# Patient Record
Sex: MALE | Race: WHITE | Employment: FULL TIME | URBAN - METROPOLITAN AREA
[De-identification: names, ages, dates, MRNs, and addresses within clinical notes are randomized per-mention and may not be internally consistent; named-entity substitution may affect disease eponyms.]

---

## 2024-07-30 ENCOUNTER — HOSPITAL ENCOUNTER (INPATIENT)
Age: 59
LOS: 3 days | Discharge: HOME | End: 2024-08-02
Attending: EMERGENCY MEDICINE | Admitting: INTERNAL MEDICINE
Payer: COMMERCIAL

## 2024-07-30 ENCOUNTER — APPOINTMENT (OUTPATIENT)
Dept: CT IMAGING | Age: 59
End: 2024-07-30
Payer: COMMERCIAL

## 2024-07-30 DIAGNOSIS — K56.609 SBO (SMALL BOWEL OBSTRUCTION) (HCC): Primary | ICD-10-CM

## 2024-07-30 LAB
ALBUMIN SERPL-MCNC: 4.3 G/DL (ref 3.5–5.2)
ALP SERPL-CCNC: 75 U/L (ref 40–129)
ALT SERPL-CCNC: 20 U/L (ref 5–41)
ANION GAP SERPL CALCULATED.3IONS-SCNC: 12 MMOL/L (ref 9–17)
AST SERPL-CCNC: 12 U/L
BASOPHILS # BLD: 0.1 K/UL (ref 0–0.2)
BASOPHILS NFR BLD: 1 % (ref 0–2)
BILIRUB SERPL-MCNC: 0.5 MG/DL (ref 0.3–1.2)
BILIRUB UR QL STRIP: NEGATIVE
BUN SERPL-MCNC: 11 MG/DL (ref 6–20)
CALCIUM SERPL-MCNC: 9.7 MG/DL (ref 8.6–10.4)
CHLORIDE SERPL-SCNC: 99 MMOL/L (ref 98–107)
CLARITY UR: CLEAR
CO2 SERPL-SCNC: 25 MMOL/L (ref 20–31)
COLOR UR: YELLOW
COMMENT: NORMAL
CREAT SERPL-MCNC: 0.9 MG/DL (ref 0.7–1.2)
EOSINOPHIL # BLD: 0.1 K/UL (ref 0–0.4)
EOSINOPHILS RELATIVE PERCENT: 1 % (ref 0–4)
ERYTHROCYTE [DISTWIDTH] IN BLOOD BY AUTOMATED COUNT: 13 % (ref 11.5–14.9)
GFR, ESTIMATED: >90 ML/MIN/1.73M2
GLUCOSE SERPL-MCNC: 156 MG/DL (ref 70–99)
GLUCOSE UR STRIP-MCNC: NEGATIVE MG/DL
HCT VFR BLD AUTO: 39.5 % (ref 41–53)
HGB BLD-MCNC: 13.1 G/DL (ref 13.5–17.5)
HGB UR QL STRIP.AUTO: NEGATIVE
KETONES UR STRIP-MCNC: NEGATIVE MG/DL
LACTATE BLDV-SCNC: 1 MMOL/L (ref 0.5–2.2)
LEUKOCYTE ESTERASE UR QL STRIP: NEGATIVE
LIPASE SERPL-CCNC: 19 U/L (ref 13–60)
LYMPHOCYTES NFR BLD: 1.4 K/UL (ref 1–4.8)
LYMPHOCYTES RELATIVE PERCENT: 18 % (ref 24–44)
MCH RBC QN AUTO: 31.4 PG (ref 26–34)
MCHC RBC AUTO-ENTMCNC: 33.2 G/DL (ref 31–37)
MCV RBC AUTO: 94.4 FL (ref 80–100)
MONOCYTES NFR BLD: 0.9 K/UL (ref 0.1–1.3)
MONOCYTES NFR BLD: 12 % (ref 1–7)
NEUTROPHILS NFR BLD: 68 % (ref 36–66)
NEUTS SEG NFR BLD: 5.5 K/UL (ref 1.3–9.1)
NITRITE UR QL STRIP: NEGATIVE
PH UR STRIP: 7 [PH] (ref 5–8)
PLATELET # BLD AUTO: 287 K/UL (ref 150–450)
PMV BLD AUTO: 7.1 FL (ref 6–12)
POTASSIUM SERPL-SCNC: 4.1 MMOL/L (ref 3.7–5.3)
PROT SERPL-MCNC: 7.3 G/DL (ref 6.4–8.3)
PROT UR STRIP-MCNC: NEGATIVE MG/DL
RBC # BLD AUTO: 4.19 M/UL (ref 4.5–5.9)
SODIUM SERPL-SCNC: 136 MMOL/L (ref 135–144)
SP GR UR STRIP: 1.01 (ref 1–1.03)
T4 FREE SERPL-MCNC: 1.2 NG/DL (ref 0.9–1.7)
TSH SERPL DL<=0.05 MIU/L-ACNC: 1.25 UIU/ML (ref 0.3–5)
UROBILINOGEN UR STRIP-ACNC: NORMAL EU/DL (ref 0–1)
WBC OTHER # BLD: 7.9 K/UL (ref 3.5–11)

## 2024-07-30 PROCEDURE — 0D9670Z DRAINAGE OF STOMACH WITH DRAINAGE DEVICE, VIA NATURAL OR ARTIFICIAL OPENING: ICD-10-PCS | Performed by: INTERNAL MEDICINE

## 2024-07-30 PROCEDURE — 74177 CT ABD & PELVIS W/CONTRAST: CPT

## 2024-07-30 PROCEDURE — 2580000003 HC RX 258: Performed by: EMERGENCY MEDICINE

## 2024-07-30 PROCEDURE — 84439 ASSAY OF FREE THYROXINE: CPT

## 2024-07-30 PROCEDURE — 96375 TX/PRO/DX INJ NEW DRUG ADDON: CPT

## 2024-07-30 PROCEDURE — 1200000000 HC SEMI PRIVATE

## 2024-07-30 PROCEDURE — 80053 COMPREHEN METABOLIC PANEL: CPT

## 2024-07-30 PROCEDURE — 83605 ASSAY OF LACTIC ACID: CPT

## 2024-07-30 PROCEDURE — 83690 ASSAY OF LIPASE: CPT

## 2024-07-30 PROCEDURE — 99285 EMERGENCY DEPT VISIT HI MDM: CPT

## 2024-07-30 PROCEDURE — 6360000004 HC RX CONTRAST MEDICATION: Performed by: EMERGENCY MEDICINE

## 2024-07-30 PROCEDURE — 36415 COLL VENOUS BLD VENIPUNCTURE: CPT

## 2024-07-30 PROCEDURE — 81003 URINALYSIS AUTO W/O SCOPE: CPT

## 2024-07-30 PROCEDURE — 96374 THER/PROPH/DIAG INJ IV PUSH: CPT

## 2024-07-30 PROCEDURE — 85025 COMPLETE CBC W/AUTO DIFF WBC: CPT

## 2024-07-30 PROCEDURE — 6360000002 HC RX W HCPCS: Performed by: EMERGENCY MEDICINE

## 2024-07-30 PROCEDURE — 84443 ASSAY THYROID STIM HORMONE: CPT

## 2024-07-30 RX ORDER — LOSARTAN POTASSIUM 25 MG/1
25 TABLET ORAL NIGHTLY
COMMUNITY

## 2024-07-30 RX ORDER — ONDANSETRON 2 MG/ML
4 INJECTION INTRAMUSCULAR; INTRAVENOUS ONCE
Status: COMPLETED | OUTPATIENT
Start: 2024-07-30 | End: 2024-07-30

## 2024-07-30 RX ORDER — SODIUM CHLORIDE 0.9 % (FLUSH) 0.9 %
10 SYRINGE (ML) INJECTION PRN
Status: COMPLETED | OUTPATIENT
Start: 2024-07-30 | End: 2024-07-30

## 2024-07-30 RX ORDER — 0.9 % SODIUM CHLORIDE 0.9 %
100 INTRAVENOUS SOLUTION INTRAVENOUS ONCE
Status: COMPLETED | OUTPATIENT
Start: 2024-07-30 | End: 2024-07-30

## 2024-07-30 RX ORDER — DROPERIDOL 2.5 MG/ML
0.62 INJECTION, SOLUTION INTRAMUSCULAR; INTRAVENOUS EVERY 6 HOURS PRN
Status: DISCONTINUED | OUTPATIENT
Start: 2024-07-30 | End: 2024-08-02 | Stop reason: HOSPADM

## 2024-07-30 RX ORDER — FAMOTIDINE 10 MG
20 TABLET ORAL DAILY
COMMUNITY

## 2024-07-30 RX ORDER — 0.9 % SODIUM CHLORIDE 0.9 %
1000 INTRAVENOUS SOLUTION INTRAVENOUS ONCE
Status: COMPLETED | OUTPATIENT
Start: 2024-07-30 | End: 2024-07-30

## 2024-07-30 RX ORDER — DROPERIDOL 2.5 MG/ML
0.62 INJECTION, SOLUTION INTRAMUSCULAR; INTRAVENOUS ONCE
Status: COMPLETED | OUTPATIENT
Start: 2024-07-30 | End: 2024-07-30

## 2024-07-30 RX ADMIN — SODIUM CHLORIDE 100 ML: 9 INJECTION, SOLUTION INTRAVENOUS at 22:48

## 2024-07-30 RX ADMIN — IOPAMIDOL 75 ML: 755 INJECTION, SOLUTION INTRAVENOUS at 22:49

## 2024-07-30 RX ADMIN — DROPERIDOL 0.62 MG: 2.5 INJECTION, SOLUTION INTRAMUSCULAR; INTRAVENOUS at 22:35

## 2024-07-30 RX ADMIN — ONDANSETRON 4 MG: 2 INJECTION INTRAMUSCULAR; INTRAVENOUS at 21:47

## 2024-07-30 RX ADMIN — SODIUM CHLORIDE 1000 ML: 9 INJECTION, SOLUTION INTRAVENOUS at 21:46

## 2024-07-30 RX ADMIN — SODIUM CHLORIDE, PRESERVATIVE FREE 10 ML: 5 INJECTION INTRAVENOUS at 22:49

## 2024-07-30 ASSESSMENT — ENCOUNTER SYMPTOMS
EYE DISCHARGE: 0
NAUSEA: 1
ABDOMINAL DISTENTION: 1
FACIAL SWELLING: 0
BACK PAIN: 0
TROUBLE SWALLOWING: 0
EYE PAIN: 0
DIARRHEA: 1
COLOR CHANGE: 0
EYE REDNESS: 0
CHEST TIGHTNESS: 0
BLOOD IN STOOL: 0
VOMITING: 1
RHINORRHEA: 0
SORE THROAT: 0
SINUS PRESSURE: 0
ABDOMINAL PAIN: 1
SHORTNESS OF BREATH: 0
WHEEZING: 0
CONSTIPATION: 0
COUGH: 0

## 2024-07-30 ASSESSMENT — PAIN DESCRIPTION - DESCRIPTORS: DESCRIPTORS: CRAMPING

## 2024-07-30 ASSESSMENT — PAIN DESCRIPTION - ORIENTATION: ORIENTATION: LOWER

## 2024-07-30 ASSESSMENT — PAIN DESCRIPTION - PAIN TYPE: TYPE: ACUTE PAIN

## 2024-07-30 ASSESSMENT — PAIN SCALES - GENERAL: PAINLEVEL_OUTOF10: 5

## 2024-07-30 ASSESSMENT — PAIN DESCRIPTION - LOCATION: LOCATION: ABDOMEN

## 2024-07-30 ASSESSMENT — PAIN - FUNCTIONAL ASSESSMENT: PAIN_FUNCTIONAL_ASSESSMENT: 0-10

## 2024-07-31 ENCOUNTER — APPOINTMENT (OUTPATIENT)
Dept: GENERAL RADIOLOGY | Age: 59
End: 2024-07-31
Payer: COMMERCIAL

## 2024-07-31 ENCOUNTER — APPOINTMENT (OUTPATIENT)
Dept: GENERAL RADIOLOGY | Age: 59
End: 2024-07-31
Attending: SURGERY
Payer: COMMERCIAL

## 2024-07-31 LAB
ANION GAP SERPL CALCULATED.3IONS-SCNC: 11 MMOL/L (ref 9–17)
BASOPHILS # BLD: 0 K/UL (ref 0–0.2)
BASOPHILS NFR BLD: 1 % (ref 0–2)
BUN SERPL-MCNC: 9 MG/DL (ref 6–20)
CALCIUM SERPL-MCNC: 8.8 MG/DL (ref 8.6–10.4)
CHLORIDE SERPL-SCNC: 102 MMOL/L (ref 98–107)
CO2 SERPL-SCNC: 23 MMOL/L (ref 20–31)
CREAT SERPL-MCNC: 0.9 MG/DL (ref 0.7–1.2)
EOSINOPHIL # BLD: 0.1 K/UL (ref 0–0.4)
EOSINOPHILS RELATIVE PERCENT: 1 % (ref 0–4)
ERYTHROCYTE [DISTWIDTH] IN BLOOD BY AUTOMATED COUNT: 13.1 % (ref 11.5–14.9)
GFR, ESTIMATED: >90 ML/MIN/1.73M2
GLUCOSE SERPL-MCNC: 123 MG/DL (ref 70–99)
HCT VFR BLD AUTO: 37.6 % (ref 41–53)
HGB BLD-MCNC: 12.5 G/DL (ref 13.5–17.5)
LACTATE BLDV-SCNC: 0.9 MMOL/L (ref 0.5–2.2)
LYMPHOCYTES NFR BLD: 1.9 K/UL (ref 1–4.8)
LYMPHOCYTES RELATIVE PERCENT: 24 % (ref 24–44)
MCH RBC QN AUTO: 32.2 PG (ref 26–34)
MCHC RBC AUTO-ENTMCNC: 33.4 G/DL (ref 31–37)
MCV RBC AUTO: 96.7 FL (ref 80–100)
MONOCYTES NFR BLD: 1.1 K/UL (ref 0.1–1.3)
MONOCYTES NFR BLD: 13 % (ref 1–7)
NEUTROPHILS NFR BLD: 61 % (ref 36–66)
NEUTS SEG NFR BLD: 4.9 K/UL (ref 1.3–9.1)
PLATELET # BLD AUTO: 271 K/UL (ref 150–450)
PMV BLD AUTO: 6.9 FL (ref 6–12)
POTASSIUM SERPL-SCNC: 4 MMOL/L (ref 3.7–5.3)
RBC # BLD AUTO: 3.89 M/UL (ref 4.5–5.9)
SODIUM SERPL-SCNC: 136 MMOL/L (ref 135–144)
WBC OTHER # BLD: 8 K/UL (ref 3.5–11)

## 2024-07-31 PROCEDURE — 83605 ASSAY OF LACTIC ACID: CPT

## 2024-07-31 PROCEDURE — 83036 HEMOGLOBIN GLYCOSYLATED A1C: CPT

## 2024-07-31 PROCEDURE — 74250 X-RAY XM SM INT 1CNTRST STD: CPT

## 2024-07-31 PROCEDURE — 6360000004 HC RX CONTRAST MEDICATION: Performed by: SURGERY

## 2024-07-31 PROCEDURE — 74018 RADEX ABDOMEN 1 VIEW: CPT

## 2024-07-31 PROCEDURE — 6360000002 HC RX W HCPCS: Performed by: SURGERY

## 2024-07-31 PROCEDURE — 2580000003 HC RX 258: Performed by: EMERGENCY MEDICINE

## 2024-07-31 PROCEDURE — 1200000000 HC SEMI PRIVATE

## 2024-07-31 PROCEDURE — 36415 COLL VENOUS BLD VENIPUNCTURE: CPT

## 2024-07-31 PROCEDURE — 2500000003 HC RX 250 WO HCPCS: Performed by: NURSE PRACTITIONER

## 2024-07-31 PROCEDURE — 6360000002 HC RX W HCPCS: Performed by: NURSE PRACTITIONER

## 2024-07-31 PROCEDURE — 2580000003 HC RX 258: Performed by: SURGERY

## 2024-07-31 PROCEDURE — 80048 BASIC METABOLIC PNL TOTAL CA: CPT

## 2024-07-31 PROCEDURE — 85025 COMPLETE CBC W/AUTO DIFF WBC: CPT

## 2024-07-31 PROCEDURE — 99223 1ST HOSP IP/OBS HIGH 75: CPT | Performed by: SURGERY

## 2024-07-31 PROCEDURE — 99223 1ST HOSP IP/OBS HIGH 75: CPT | Performed by: INTERNAL MEDICINE

## 2024-07-31 PROCEDURE — 2580000003 HC RX 258: Performed by: NURSE PRACTITIONER

## 2024-07-31 PROCEDURE — 6360000002 HC RX W HCPCS: Performed by: EMERGENCY MEDICINE

## 2024-07-31 PROCEDURE — 71045 X-RAY EXAM CHEST 1 VIEW: CPT

## 2024-07-31 RX ORDER — ENOXAPARIN SODIUM 100 MG/ML
40 INJECTION SUBCUTANEOUS DAILY
Status: DISCONTINUED | OUTPATIENT
Start: 2024-07-31 | End: 2024-08-02 | Stop reason: HOSPADM

## 2024-07-31 RX ORDER — SODIUM CHLORIDE 0.9 % (FLUSH) 0.9 %
10 SYRINGE (ML) INJECTION PRN
Status: DISCONTINUED | OUTPATIENT
Start: 2024-07-31 | End: 2024-08-02 | Stop reason: HOSPADM

## 2024-07-31 RX ORDER — ONDANSETRON 4 MG/1
4 TABLET, ORALLY DISINTEGRATING ORAL EVERY 8 HOURS PRN
Status: DISCONTINUED | OUTPATIENT
Start: 2024-07-31 | End: 2024-08-02 | Stop reason: HOSPADM

## 2024-07-31 RX ORDER — POTASSIUM CHLORIDE 20 MEQ/1
40 TABLET, EXTENDED RELEASE ORAL PRN
Status: DISCONTINUED | OUTPATIENT
Start: 2024-07-31 | End: 2024-08-01

## 2024-07-31 RX ORDER — SODIUM CHLORIDE 9 MG/ML
INJECTION, SOLUTION INTRAVENOUS PRN
Status: DISCONTINUED | OUTPATIENT
Start: 2024-07-31 | End: 2024-08-02 | Stop reason: HOSPADM

## 2024-07-31 RX ORDER — ENALAPRILAT 1.25 MG/ML
1.25 INJECTION INTRAVENOUS EVERY 6 HOURS PRN
Status: DISCONTINUED | OUTPATIENT
Start: 2024-07-31 | End: 2024-08-02 | Stop reason: HOSPADM

## 2024-07-31 RX ORDER — ACETAMINOPHEN 325 MG/1
650 TABLET ORAL EVERY 6 HOURS PRN
Status: DISCONTINUED | OUTPATIENT
Start: 2024-07-31 | End: 2024-08-02 | Stop reason: HOSPADM

## 2024-07-31 RX ORDER — ENOXAPARIN SODIUM 100 MG/ML
30 INJECTION SUBCUTANEOUS DAILY
Status: DISCONTINUED | OUTPATIENT
Start: 2024-07-31 | End: 2024-07-31

## 2024-07-31 RX ORDER — ONDANSETRON 2 MG/ML
4 INJECTION INTRAMUSCULAR; INTRAVENOUS EVERY 6 HOURS PRN
Status: DISCONTINUED | OUTPATIENT
Start: 2024-07-31 | End: 2024-08-02 | Stop reason: HOSPADM

## 2024-07-31 RX ORDER — SODIUM CHLORIDE 0.9 % (FLUSH) 0.9 %
5-40 SYRINGE (ML) INJECTION EVERY 12 HOURS SCHEDULED
Status: DISCONTINUED | OUTPATIENT
Start: 2024-07-31 | End: 2024-08-02 | Stop reason: HOSPADM

## 2024-07-31 RX ORDER — HYDRALAZINE HYDROCHLORIDE 20 MG/ML
10 INJECTION INTRAMUSCULAR; INTRAVENOUS EVERY 6 HOURS PRN
Status: DISCONTINUED | OUTPATIENT
Start: 2024-07-31 | End: 2024-08-02 | Stop reason: HOSPADM

## 2024-07-31 RX ORDER — POTASSIUM CHLORIDE 7.45 MG/ML
10 INJECTION INTRAVENOUS PRN
Status: DISCONTINUED | OUTPATIENT
Start: 2024-07-31 | End: 2024-08-01

## 2024-07-31 RX ORDER — ACETAMINOPHEN 650 MG/1
650 SUPPOSITORY RECTAL EVERY 6 HOURS PRN
Status: DISCONTINUED | OUTPATIENT
Start: 2024-07-31 | End: 2024-08-02 | Stop reason: HOSPADM

## 2024-07-31 RX ORDER — MAGNESIUM SULFATE HEPTAHYDRATE 40 MG/ML
2000 INJECTION, SOLUTION INTRAVENOUS PRN
Status: DISCONTINUED | OUTPATIENT
Start: 2024-07-31 | End: 2024-08-02 | Stop reason: HOSPADM

## 2024-07-31 RX ORDER — SODIUM CHLORIDE 9 MG/ML
INJECTION, SOLUTION INTRAVENOUS CONTINUOUS
Status: ACTIVE | OUTPATIENT
Start: 2024-07-31 | End: 2024-08-02

## 2024-07-31 RX ORDER — MORPHINE SULFATE 2 MG/ML
2 INJECTION, SOLUTION INTRAMUSCULAR; INTRAVENOUS
Status: DISCONTINUED | OUTPATIENT
Start: 2024-07-31 | End: 2024-08-02 | Stop reason: HOSPADM

## 2024-07-31 RX ADMIN — MORPHINE SULFATE 2 MG: 2 INJECTION, SOLUTION INTRAMUSCULAR; INTRAVENOUS at 00:11

## 2024-07-31 RX ADMIN — SODIUM CHLORIDE: 9 INJECTION, SOLUTION INTRAVENOUS at 18:07

## 2024-07-31 RX ADMIN — DIATRIZOATE MEGLUMINE AND DIATRIZOATE SODIUM 360 ML: 660; 100 LIQUID ORAL; RECTAL at 08:39

## 2024-07-31 RX ADMIN — SODIUM CHLORIDE: 9 INJECTION, SOLUTION INTRAVENOUS at 00:33

## 2024-07-31 RX ADMIN — DROPERIDOL 0.62 MG: 2.5 INJECTION, SOLUTION INTRAMUSCULAR; INTRAVENOUS at 12:36

## 2024-07-31 RX ADMIN — ONDANSETRON 4 MG: 2 INJECTION INTRAMUSCULAR; INTRAVENOUS at 09:14

## 2024-07-31 RX ADMIN — ENOXAPARIN SODIUM 40 MG: 100 INJECTION SUBCUTANEOUS at 09:02

## 2024-07-31 RX ADMIN — ENALAPRILAT 1.25 MG: 1.25 INJECTION INTRAVENOUS at 09:02

## 2024-07-31 RX ADMIN — SODIUM CHLORIDE, PRESERVATIVE FREE 40 MG: 5 INJECTION INTRAVENOUS at 09:02

## 2024-07-31 RX ADMIN — PANTOPRAZOLE SODIUM 40 MG: 40 INJECTION, POWDER, FOR SOLUTION INTRAVENOUS at 00:11

## 2024-07-31 ASSESSMENT — ENCOUNTER SYMPTOMS
RHINORRHEA: 0
SHORTNESS OF BREATH: 0
SORE THROAT: 0
COUGH: 0

## 2024-07-31 ASSESSMENT — PAIN SCALES - GENERAL
PAINLEVEL_OUTOF10: 8
PAINLEVEL_OUTOF10: 0
PAINLEVEL_OUTOF10: 0

## 2024-07-31 NOTE — PROGRESS NOTES
Pt arrive to the floor and place in room 2050. Pt alert x 4. Admission question and assessment completed. Pt NG tube hook up to low- intermittent suction. Bedside table and call light in reach. No s/s of distress noted. Plan of care on going.     Pt diet order updated to NPO w/ ice chips. Order receive from Sue OCHOA

## 2024-07-31 NOTE — PLAN OF CARE
Problem: Discharge Planning  Goal: Discharge to home or other facility with appropriate resources  Outcome: Progressing  Flowsheets  Taken 7/31/2024 0517  Discharge to home or other facility with appropriate resources:   Identify barriers to discharge with patient and caregiver   Arrange for needed discharge resources and transportation as appropriate   Identify discharge learning needs (meds, wound care, etc)   Refer to discharge planning if patient needs post-hospital services based on physician order or complex needs related to functional status, cognitive ability or social support system  Taken 7/31/2024 0041  Discharge to home or other facility with appropriate resources:   Identify barriers to discharge with patient and caregiver   Identify discharge learning needs (meds, wound care, etc)   Refer to discharge planning if patient needs post-hospital services based on physician order or complex needs related to functional status, cognitive ability or social support system   Arrange for needed discharge resources and transportation as appropriate  Note: Inform pt. Of discharge teaching and planned. Instructed pt. To inform me if further teaching need to be done.      Problem: Pain  Goal: Verbalizes/displays adequate comfort level or baseline comfort level  Outcome: Progressing  Flowsheets (Taken 7/31/2024 0517)  Verbalizes/displays adequate comfort level or baseline comfort level:   Assess pain using appropriate pain scale   Encourage patient to monitor pain and request assistance   Administer analgesics based on type and severity of pain and evaluate response   Implement non-pharmacological measures as appropriate and evaluate response   Consider cultural and social influences on pain and pain management   Notify Licensed Independent Practitioner if interventions unsuccessful or patient reports new pain  Note: PT. Received pain meds in timely manner. Teach pt. Non-pharm. Methods to handle pain.      Problem:

## 2024-07-31 NOTE — PROGRESS NOTES
[facility] Internal Medicine  Carlos Galaviz MD; Dada Dorman MD; Edwin Ramos MD; MD Evita Reyes MD; Radha Souza MD  [facility] Internal Medicine   IN-PATIENT SERVICE  [facility]                 Date:   7/31/2024  Patientname:  Gareth Torres  Date of admission:  7/30/2024  9:30 PM  MRN:   [id number]  Account:  [id number]  YOB: 1965  PCP:    Homar Panda MD  Room:   2050/2050-01  Code Status:    Full Code      Chief Complaint:     Abdominal pain, nausea, vomiting       History of Present Illness:     Gareth Torres is a 59 y.o. Non- / non  male who presents with Abdominal pain, nausea, vomiting   and is admitted to the hospital for the management of SBO (small bowel obstruction) (HCC). Medical history significant for HTN, HLD, hernia repair x2. According to patient, symptoms began on Saturday with abdominal discomfort and nausea. Progressively worse with intermittent diarrhea. Tried pepto-Bismol without improvement. Progressed with vomiting with abdominal distention. Reports being unable to pass gas for the past few hours. CT abdomen reveals SBO. General Surgery consulted in ED, NG tube placed. Denies fever, chills, chest pain, cough and urinary symptoms. Symptoms are reported as acute, constant and severe.     Past Medical History:     History reviewed. No pertinent past medical history.     Past Surgical History:     History reviewed. No pertinent surgical history.     Medications Prior to Admission:     Prior to Admission medications    Medication Sig Start Date End Date Taking? Authorizing Provider   verapamil (CALAN SR) 180 MG extended release tablet Take 2 tablets by mouth daily   Yes Shekhar Singh MD   famotidine (PEPCID) 10 MG tablet Take 2 tablets by mouth daily   Yes Shekhar Singh MD   losartan (COZAAR) 25 MG tablet Take 1 tablet by mouth at bedtime   Yes Shekhar Singh MD   atorvastatin  (LIPITOR) 10 MG tablet TAKE 1 TABLET BY MOUTH ONE TIME A DAY  10/17/16   Homar Panda MD        Allergies:     Patient has no known allergies.    Social History:     Tobacco:    reports that he has never smoked. He has never used smokeless tobacco.  Alcohol:      reports current alcohol use.  Drug Use:  reports no history of drug use.    Family History:     History reviewed. No pertinent family history.    Investigations:      Laboratory Testing:  Recent Results (from the past 24 hour(s))   CBC with Auto Differential    Collection Time: 07/30/24  9:37 PM   Result Value Ref Range    WBC 7.9 3.5 - 11.0 k/uL    RBC 4.19 (L) 4.5 - 5.9 m/uL    Hemoglobin 13.1 (L) 13.5 - 17.5 g/dL    Hematocrit 39.5 (L) 41 - 53 %    MCV 94.4 80 - 100 fL    MCH 31.4 26 - 34 pg    MCHC 33.2 31 - 37 g/dL    RDW 13.0 11.5 - 14.9 %    Platelets 287 150 - 450 k/uL    MPV 7.1 6.0 - 12.0 fL    Neutrophils % 68 (H) 36 - 66 %    Lymphocytes % 18 (L) 24 - 44 %    Monocytes % 12 (H) 1 - 7 %    Eosinophils % 1 0 - 4 %    Basophils % 1 0 - 2 %    Neutrophils Absolute 5.50 1.3 - 9.1 k/uL    Lymphocytes Absolute 1.40 1.0 - 4.8 k/uL    Monocytes Absolute 0.90 0.1 - 1.3 k/uL    Eosinophils Absolute 0.10 0.0 - 0.4 k/uL    Basophils Absolute 0.10 0.0 - 0.2 k/uL   Comprehensive Metabolic Panel    Collection Time: 07/30/24  9:37 PM   Result Value Ref Range    Sodium 136 135 - 144 mmol/L    Potassium 4.1 3.7 - 5.3 mmol/L    Chloride 99 98 - 107 mmol/L    CO2 25 20 - 31 mmol/L    Anion Gap 12 9 - 17 mmol/L    Glucose 156 (H) 70 - 99 mg/dL    BUN 11 6 - 20 mg/dL    Creatinine 0.9 0.7 - 1.2 mg/dL    Est, Glom Filt Rate >90 >60 mL/min/1.73m2    Calcium 9.7 8.6 - 10.4 mg/dL    Total Protein 7.3 6.4 - 8.3 g/dL    Albumin 4.3 3.5 - 5.2 g/dL    Total Bilirubin 0.5 0.3 - 1.2 mg/dL    Alkaline Phosphatase 75 40 - 129 U/L    ALT 20 5 - 41 U/L    AST 12 <40 U/L   Lipase    Collection Time: 07/30/24  9:37 PM   Result Value Ref Range    Lipase 19 13 - 60 U/L   TSH

## 2024-07-31 NOTE — PROGRESS NOTES
07/31/24 1533   Encounter Summary   Encounter Overview/Reason Attempted Encounter   Service Provided For Patient not available  (patient with staff)

## 2024-07-31 NOTE — PLAN OF CARE
Problem: Discharge Planning  Goal: Discharge to home or other facility with appropriate resources  7/31/2024 1100 by Regina Chin RN  Outcome: Progressing     Problem: Pain  Goal: Verbalizes/displays adequate comfort level or baseline comfort level  7/31/2024 1100 by Regina Chin RN  Outcome: Progressing     Problem: Safety - Adult  Goal: Free from fall injury  7/31/2024 1100 by Regina Cihn RN  Outcome: Progressing  Flowsheets (Taken 7/31/2024 1059)  Free From Fall Injury: Instruct family/caregiver on patient safety     Problem: ABCDS Injury Assessment  Goal: Absence of physical injury  Outcome: Progressing

## 2024-07-31 NOTE — ED PROVIDER NOTES
eMERGENCY dEPARTMENT eNCOUnter      Pt Name: Gareth Torres  MRN: [id number]  Birthdate 1965  Date of evaluation: 7/30/24      CHIEF COMPLAINT     No chief complaint on file.        HISTORY OF PRESENT ILLNESS    Gareth Torres is a 59 y.o. male who presents complaining of abdominal pain.  Patient states he started getting sick Saturday evening.  Patient states he was having epigastric abdominal pain nauseated some intermittent diarrhea.  Patient was taking Pepto-Bismol which was not helping.  Patient states his abdomen is getting distended.  Patient states he is still passing gas but over the last few hours he has been burping almost nonstop.  Patient denies any fever congestion or cough.  Patient has had no blood in his stool or vomit.  Patient is denying any urinary symptoms.  Patient has had no abdominal surgeries in the past.  Patient has a history of hypertension and high cholesterol.      REVIEW OF SYSTEMS       Review of Systems   Constitutional:  Positive for fatigue. Negative for activity change, appetite change, chills, diaphoresis and fever.   HENT:  Negative for congestion, ear pain, facial swelling, nosebleeds, rhinorrhea, sinus pressure, sore throat and trouble swallowing.    Eyes:  Negative for pain, discharge and redness.   Respiratory:  Negative for cough, chest tightness, shortness of breath and wheezing.    Cardiovascular:  Negative for chest pain, palpitations and leg swelling.   Gastrointestinal:  Positive for abdominal distention, abdominal pain, diarrhea, nausea and vomiting. Negative for blood in stool and constipation.   Genitourinary:  Negative for difficulty urinating, dysuria, flank pain, frequency, genital sores and hematuria.   Musculoskeletal:  Negative for arthralgias, back pain, gait problem, joint swelling, myalgias and neck pain.   Skin:  Negative for color change, pallor, rash and wound.   Neurological:  Negative for dizziness, tremors, seizures, syncope, speech difficulty,

## 2024-07-31 NOTE — PROGRESS NOTES
07/31/24 1250   Encounter Summary   Encounter Overview/Reason Volunteer Encounter   Service Provided For Patient   Referral/Consult From Rounding   Last Encounter  07/31/24   Complexity of Encounter Low   Spiritual/Emotional needs   Type Spiritual Support   Assessment/Intervention/Outcome   Intervention Prayer (assurance of)/[place]

## 2024-07-31 NOTE — PROGRESS NOTES
Writer received call from Dr. Del Valle regarding admission.  Chart reviewed, new orders received.  See orders.

## 2024-07-31 NOTE — CONSULTS
[facility] General Surgery   Emeka Del Valle MD, FACS  Sandee CANASBeni Kishor, APRN-CNP  [address], [address]  [place] [address]  P: [phone], F: [phone]    General and Robotic Surgery  Consult Note         PATIENT NAME: Gareth Torres   :  1965   MRN: [id number]   PCP:  Homar Panda MD   Referring Physician: Dr. ALONSO   Reason for Consult: Abdominal pain    TODAY'S DATE: 2024    HISTORY OF PRESENT ILLNESS: 59 y.o. male presents with abdominal pain nausea vomiting.  Patient had some diarrhea.  He presented with increasing abdominal pain that started over the weekend.  He was working in his yard Saturday and  and started complaining of some cramps along with some diarrhea nausea and vomiting.  He felt better off-and-on.  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  Denied eating anything unusual.  No hematemesis or rectal bleeding.  Patient has had colonoscopy last year by GI physician and according to the patient it was normal.  Patient went to work on Monday but then got progressively worse as the day went on and came to the emergency department with increasing abdominal pain distention bloating and vomiting.  Patient with bilateral inguinal hernia repair in the past many years ago.  Denied any fever today but had some low-grade temp at home.  No urinary symptoms.  No history of kidney stone.  ER workup reviewed.  Abdominal pain is generalized.  No point tenderness.  Patient was diagnosed with small bowel obstruction on the CT scan and had an NG tube placed in the emergency department and since then patient is feeling better.  He is passing flatus this morning.    PAST MEDICAL HISTORY   History reviewed. No pertinent past medical history.    PROBLEM LIST     Patient Active Problem List   Diagnosis    SBO (small bowel obstruction) (HCC)       SURGICAL HISTORY     History reviewed. No pertinent surgical history.    ALLERGIES       Mood and Affect: Mood normal.                Data  Lab Results   Component Value Date    WBC 8.0 07/31/2024    HGB 12.5 (L) 07/31/2024    HCT 37.6 (L) 07/31/2024    MCV 96.7 07/31/2024     07/31/2024     Lab Results   Component Value Date     07/30/2024    K 4.1 07/30/2024    CL 99 07/30/2024    CO2 25 07/30/2024    BUN 11 07/30/2024    CREATININE 0.9 07/30/2024    GLUCOSE 156 (H) 07/30/2024    CALCIUM 9.7 07/30/2024    BILITOT 0.5 07/30/2024    ALKPHOS 75 07/30/2024    AST 12 07/30/2024    ALT 20 07/30/2024    LABGLOM >90 07/30/2024    GFRAA >60 10/29/2013    GLOB NOT REPORTED 03/07/2014           Radiology Review:    CT Result (most recent):  CT ABDOMEN PELVIS W IV CONTRAST 07/30/2024    Narrative  EXAMINATION:  CT OF THE ABDOMEN AND PELVIS WITH CONTRAST 7/30/2024 10:27 pm    TECHNIQUE:  CT of the abdomen and pelvis was performed with the administration of  intravenous contrast. Multiplanar reformatted images are provided for review.  Automated exposure control, iterative reconstruction, and/or weight based  adjustment of the mA/kV was utilized to reduce the radiation dose to as low  as reasonably achievable.    COMPARISON:  None.    HISTORY:  ORDERING SYSTEM PROVIDED HISTORY: Abd pain, vomiting, burping  TECHNOLOGIST PROVIDED HISTORY:    Abd pain, vomiting, burping  Decision Support Exception - unselect if not a suspected or confirmed  emergency medical condition->Emergency Medical Condition (MA)  Reason for Exam: Abd pain, vomiting, burping  Additional signs and symptoms: c/o periumbilical abdominal pain with  nausea/vomiting and excessive burping. On-going for 3-4 days    FINDINGS:  Lower Chest: Clear    Organs:    Fat containing umbilical hernia.    The liver, spleen, pancreas, and adrenals appear normal.  Gallbladder normal.    Kidneys appear normal.    The bladder appears normal.    GI/Bowel: The stomach,small bowel, and colon appear normal. Colonic  diverticulosis.  Appendix normal.

## 2024-07-31 NOTE — PROGRESS NOTES
Patient was seen and examined this evening.  He has had several bowel movements during and after small bowel follow-through.  NG tube output noted.  Patient again feels better.  No abdominal pain.  No nausea vomiting.  Small bowel follow-through images reviewed.  Contrast is in the colon between 4 and 6 hours but still there are dilated loops of small bowel seen.  Findings are consistent with partial small bowel obstruction.  Patient has a umbilical hernia containing fat which is likely not causing the current issue.    Patient updated.  At this point the plan is to continue NG tube to low remittent suction and monitor the output.  Continue IV hydration.  Repeat KUB in the morning.  Depending on his clinical progress x-ray findings I will make further recommendations in terms of removing the NG tube and starting him on liquid diet tomorrow.  Patient is in agreement.  Discussed with nursing staff.  Recheck labs and KUB in the morning.

## 2024-07-31 NOTE — ED NOTES
Report given to Lina RN from Darrell GIL.   Report method by phone   The following was reviewed with receiving RN:   Current vital signs:  BP (!) 148/80   Pulse 69   Temp 97.4 °F (36.3 °C) (Oral)   Resp 14   Ht 1.702 m (5' 7\")   Wt 64.4 kg (142 lb)   SpO2 96%   BMI 22.24 kg/m²                MEWS Score: 0     Any medication or safety alerts were reviewed. Any pending diagnostics and notifications were also reviewed, as well as any safety concerns or issues, abnormal labs, abnormal imaging, and abnormal assessment findings. Questions were answered.

## 2024-07-31 NOTE — PROGRESS NOTES
07/31/24 1248   Encounter Summary   Encounter Overview/Reason Volunteer Encounter   Service Provided For Patient   Referral/Consult From Rounding   Last Encounter  07/31/24   Complexity of Encounter Low   Spiritual/Emotional needs   Type Spiritual Support   Rituals, Rites and Sacraments   Type Faith Communion

## 2024-07-31 NOTE — H&P
[facility]   IN-PATIENT SERVICE   [facility]    HISTORY AND PHYSICAL EXAMINATION            Date:   7/31/2024  Patient name:  Gareth Torres  Date of admission:  7/30/2024  9:30 PM  MRN:   [id number]  Account:  [id number]  YOB: 1965  PCP:    Homar Panda MD  Room:   2050/2050-01  Code Status:    Full Code    Chief Complaint:     No chief complaint on file.      History Obtained From:     patient    History of Present Illness:     The patient is a 59 y.o.  Non- / non  male who presents with No chief complaint on file.   and he is admitted to the hospital for the management of      Gareth Torres is a 59 y.o. Non- / non  male who presents with Abdominal pain, nausea, vomiting   and is admitted to the hospital for the management of SBO (small bowel obstruction) (HCC). Medical history significant for HTN, HLD, hernia repair x2. According to patient, symptoms began on Saturday with abdominal discomfort and nausea. Progressively worse with intermittent diarrhea. Tried pepto-Bismol without improvement. Progressed with vomiting with abdominal distention. Reports being unable to pass gas for the past few hours. CT abdomen reveals SBO. General Surgery consulted in ED, NG tube placed. Denies fever, chills, chest pain, cough and urinary symptoms. Symptoms are reported as acute, constant and severe.     Patient seen and examined on the floor, continues to have intermittent abdominal pain, had small bowel through on my encounter NG tube was clamped,  Seen by general surgeon  No chest pain shortness of      Past Medical History:     History reviewed. No pertinent past medical history.     Past Surgical History:     History reviewed. No pertinent surgical history.     Medications Prior to Admission:     Prior to Admission medications    Medication Sig Start Date End Date Taking? Authorizing Provider   verapamil (CALAN SR) 180 MG extended release tablet Take 2  tablets by mouth daily   Yes Provider, MD Shekhar   famotidine (PEPCID) 10 MG tablet Take 2 tablets by mouth daily   Yes Provider, MD Shekhar   losartan (COZAAR) 25 MG tablet Take 1 tablet by mouth at bedtime   Yes Provider, MD Shekhar   atorvastatin (LIPITOR) 10 MG tablet TAKE 1 TABLET BY MOUTH ONE TIME A DAY  10/17/16   Homar Panda MD        Allergies:     Patient has no known allergies.    Social History:     Tobacco:    reports that he has never smoked. He has never used smokeless tobacco.  Alcohol:      reports current alcohol use.  Drug Use:  reports no history of drug use.    Family History:     History reviewed. No pertinent family history.    Review of Systems:     Positive and Negative as described in HPI.    CONSTITUTIONAL:  negative for fevers, chills, sweats, fatigue, weight loss  HEENT:  negative for vision, hearing changes, runny nose, throat pain  RESPIRATORY:  negative for shortness of breath, cough, congestion, wheezing.  CARDIOVASCULAR:  negative for chest pain, palpitations.  GASTROINTESTINAL:  negative for nausea, vomiting, diarrhea, constipation, change in bowel habits, abdominal pain   GENITOURINARY:  negative for difficulty of urination, burning with urination, frequency   INTEGUMENT:  negative for rash, skin lesions, easy bruising   HEMATOLOGIC/LYMPHATIC:  negative for swelling/edema   ALLERGIC/IMMUNOLOGIC:  negative for urticaria , itching  ENDOCRINE:  negative increase in drinking, increase in urination, hot or cold intolerance  MUSCULOSKELETAL:  negative joint pains, muscle aches, swelling of joints  NEUROLOGICAL:  negative for headaches, dizziness, lightheadedness, numbness, pain, tingling extremities  BEHAVIOR/PSYCH:  negative for depression, anxiety    Physical Exam:   BP (!) 165/92   Pulse 75   Temp 97.9 °F (36.6 °C) (Axillary)   Resp 16   Ht 1.702 m (5' 7\")   Wt 65.5 kg (144 lb 6.4 oz)   SpO2 99%   BMI 22.62 kg/m²   Temp (24hrs), Av.8 °F (36.6 °C), Min:97.4 °F

## 2024-07-31 NOTE — CARE COORDINATION
Case Management Assessment  Initial Evaluation    Date/Time of Evaluation: 7/31/2024 10:36AM    Assessment Completed by: Sanaz Paul RN    If patient is discharged prior to next notation, then this note serves as note for discharge by case management.    Patient Name: Gareth Torres                   YOB: 1965  Diagnosis: SBO (small bowel obstruction) ([facility]) [K56.609]                   Date / Time: 7/30/2024  9:30 PM    Patient Admission Status: Inpatient   Readmission Risk (Low < 19, Mod (19-27), High > 27): Readmission Risk Score: 2.8    Current PCP: Homar Panda MD  PCP verified by CM? Yes    Chart Reviewed: Yes      History Provided by: Patient  Patient Orientation: Alert and Oriented    Patient Cognition: Alert    Hospitalization in the last 30 days (Readmission):  No    If yes, Readmission Assessment in CM Navigator will be completed.    Advance Directives:      Code Status: Full Code   Patient's Primary Decision Maker is:        Discharge Planning:    Patient lives with: Spouse/Significant Other, Children Type of Home: House  Primary Care Giver:    Patient Support Systems include: Spouse/Significant Other, Children   Current Financial resources: Other (Comment) (Commercial)  Current community resources: None  Current services prior to admission: None            Current DME:              Type of Home Care services:  None    ADLS  Prior functional level: Independent in ADLs/IADLs  Current functional level: Independent in ADLs/IADLs    PT AM-PAC:   /24  OT AM-PAC:   /24    Family can provide assistance at DC: Yes  Would you like Case Management to discuss the discharge plan with any other family members/significant others, and if so, who? Yes (Wife Suha)  Plans to Return to Present Housing: Yes  Other Identified Issues/Barriers to RETURNING to current housing: none  Potential Assistance needed at discharge: N/A            Potential DME:    Patient expects to discharge to: House  Plan for

## 2024-08-01 ENCOUNTER — APPOINTMENT (OUTPATIENT)
Dept: GENERAL RADIOLOGY | Age: 59
End: 2024-08-01
Payer: COMMERCIAL

## 2024-08-01 LAB
ANION GAP SERPL CALCULATED.3IONS-SCNC: 11 MMOL/L (ref 9–17)
BASOPHILS # BLD: 0 K/UL (ref 0–0.2)
BASOPHILS NFR BLD: 1 % (ref 0–2)
BUN SERPL-MCNC: 13 MG/DL (ref 6–20)
CALCIUM SERPL-MCNC: 8.3 MG/DL (ref 8.6–10.4)
CHLORIDE SERPL-SCNC: 106 MMOL/L (ref 98–107)
CO2 SERPL-SCNC: 22 MMOL/L (ref 20–31)
CREAT SERPL-MCNC: 0.8 MG/DL (ref 0.7–1.2)
EOSINOPHIL # BLD: 0.1 K/UL (ref 0–0.4)
EOSINOPHILS RELATIVE PERCENT: 2 % (ref 0–4)
ERYTHROCYTE [DISTWIDTH] IN BLOOD BY AUTOMATED COUNT: 13.5 % (ref 11.5–14.9)
GFR, ESTIMATED: >90 ML/MIN/1.73M2
GLUCOSE SERPL-MCNC: 118 MG/DL (ref 70–99)
HCT VFR BLD AUTO: 36.5 % (ref 41–53)
HGB BLD-MCNC: 11.9 G/DL (ref 13.5–17.5)
LYMPHOCYTES NFR BLD: 1.2 K/UL (ref 1–4.8)
LYMPHOCYTES RELATIVE PERCENT: 17 % (ref 24–44)
MAGNESIUM SERPL-MCNC: 2.3 MG/DL (ref 1.6–2.6)
MCH RBC QN AUTO: 32.1 PG (ref 26–34)
MCHC RBC AUTO-ENTMCNC: 32.7 G/DL (ref 31–37)
MCV RBC AUTO: 98.3 FL (ref 80–100)
MONOCYTES NFR BLD: 0.9 K/UL (ref 0.1–1.3)
MONOCYTES NFR BLD: 13 % (ref 1–7)
NEUTROPHILS NFR BLD: 67 % (ref 36–66)
NEUTS SEG NFR BLD: 4.7 K/UL (ref 1.3–9.1)
PLATELET # BLD AUTO: 248 K/UL (ref 150–450)
PMV BLD AUTO: 7.2 FL (ref 6–12)
POTASSIUM SERPL-SCNC: 3.5 MMOL/L (ref 3.7–5.3)
POTASSIUM SERPL-SCNC: 4 MMOL/L (ref 3.7–5.3)
RBC # BLD AUTO: 3.72 M/UL (ref 4.5–5.9)
SODIUM SERPL-SCNC: 139 MMOL/L (ref 135–144)
WBC OTHER # BLD: 7 K/UL (ref 3.5–11)

## 2024-08-01 PROCEDURE — 83735 ASSAY OF MAGNESIUM: CPT

## 2024-08-01 PROCEDURE — 99232 SBSQ HOSP IP/OBS MODERATE 35: CPT | Performed by: INTERNAL MEDICINE

## 2024-08-01 PROCEDURE — 1200000000 HC SEMI PRIVATE

## 2024-08-01 PROCEDURE — 2500000003 HC RX 250 WO HCPCS: Performed by: NURSE PRACTITIONER

## 2024-08-01 PROCEDURE — 99232 SBSQ HOSP IP/OBS MODERATE 35: CPT | Performed by: SURGERY

## 2024-08-01 PROCEDURE — 2580000003 HC RX 258: Performed by: SURGERY

## 2024-08-01 PROCEDURE — 6360000002 HC RX W HCPCS: Performed by: SURGERY

## 2024-08-01 PROCEDURE — 80048 BASIC METABOLIC PNL TOTAL CA: CPT

## 2024-08-01 PROCEDURE — 6370000000 HC RX 637 (ALT 250 FOR IP): Performed by: INTERNAL MEDICINE

## 2024-08-01 PROCEDURE — 2580000003 HC RX 258: Performed by: NURSE PRACTITIONER

## 2024-08-01 PROCEDURE — 2580000003 HC RX 258: Performed by: INTERNAL MEDICINE

## 2024-08-01 PROCEDURE — 85025 COMPLETE CBC W/AUTO DIFF WBC: CPT

## 2024-08-01 PROCEDURE — 6360000002 HC RX W HCPCS: Performed by: NURSE PRACTITIONER

## 2024-08-01 PROCEDURE — 36415 COLL VENOUS BLD VENIPUNCTURE: CPT

## 2024-08-01 PROCEDURE — 74018 RADEX ABDOMEN 1 VIEW: CPT

## 2024-08-01 PROCEDURE — 84132 ASSAY OF SERUM POTASSIUM: CPT

## 2024-08-01 RX ORDER — METOCLOPRAMIDE HYDROCHLORIDE 5 MG/ML
5 INJECTION INTRAMUSCULAR; INTRAVENOUS EVERY 8 HOURS
Status: DISCONTINUED | OUTPATIENT
Start: 2024-08-01 | End: 2024-08-02 | Stop reason: HOSPADM

## 2024-08-01 RX ORDER — ATORVASTATIN CALCIUM 10 MG/1
10 TABLET, FILM COATED ORAL NIGHTLY
Status: DISCONTINUED | OUTPATIENT
Start: 2024-08-01 | End: 2024-08-02 | Stop reason: HOSPADM

## 2024-08-01 RX ORDER — LOSARTAN POTASSIUM 25 MG/1
25 TABLET ORAL NIGHTLY
Status: DISCONTINUED | OUTPATIENT
Start: 2024-08-01 | End: 2024-08-02 | Stop reason: HOSPADM

## 2024-08-01 RX ORDER — POTASSIUM CHLORIDE 29.8 MG/ML
20 INJECTION INTRAVENOUS PRN
Status: DISCONTINUED | OUTPATIENT
Start: 2024-08-01 | End: 2024-08-02 | Stop reason: HOSPADM

## 2024-08-01 RX ORDER — POTASSIUM CHLORIDE 7.45 MG/ML
10 INJECTION INTRAVENOUS PRN
Status: DISCONTINUED | OUTPATIENT
Start: 2024-08-01 | End: 2024-08-02 | Stop reason: HOSPADM

## 2024-08-01 RX ADMIN — SODIUM CHLORIDE: 9 INJECTION, SOLUTION INTRAVENOUS at 05:25

## 2024-08-01 RX ADMIN — POTASSIUM CHLORIDE 10 MEQ: 7.46 INJECTION, SOLUTION INTRAVENOUS at 09:22

## 2024-08-01 RX ADMIN — METOCLOPRAMIDE 5 MG: 5 INJECTION, SOLUTION INTRAMUSCULAR; INTRAVENOUS at 23:38

## 2024-08-01 RX ADMIN — LOSARTAN POTASSIUM 25 MG: 25 TABLET, FILM COATED ORAL at 19:48

## 2024-08-01 RX ADMIN — ENALAPRILAT 1.25 MG: 1.25 INJECTION INTRAVENOUS at 00:19

## 2024-08-01 RX ADMIN — ATORVASTATIN CALCIUM 10 MG: 10 TABLET, FILM COATED ORAL at 19:48

## 2024-08-01 RX ADMIN — METOCLOPRAMIDE 5 MG: 5 INJECTION, SOLUTION INTRAMUSCULAR; INTRAVENOUS at 15:17

## 2024-08-01 RX ADMIN — POTASSIUM CHLORIDE 10 MEQ: 7.46 INJECTION, SOLUTION INTRAVENOUS at 11:51

## 2024-08-01 RX ADMIN — SODIUM CHLORIDE: 9 INJECTION, SOLUTION INTRAVENOUS at 18:24

## 2024-08-01 RX ADMIN — METOCLOPRAMIDE 5 MG: 5 INJECTION, SOLUTION INTRAMUSCULAR; INTRAVENOUS at 08:00

## 2024-08-01 RX ADMIN — ENALAPRILAT 1.25 MG: 1.25 INJECTION INTRAVENOUS at 06:17

## 2024-08-01 RX ADMIN — VERAPAMIL HYDROCHLORIDE 360 MG: 180 TABLET, FILM COATED, EXTENDED RELEASE ORAL at 13:37

## 2024-08-01 RX ADMIN — POTASSIUM CHLORIDE 10 MEQ: 7.46 INJECTION, SOLUTION INTRAVENOUS at 08:05

## 2024-08-01 RX ADMIN — ENOXAPARIN SODIUM 40 MG: 100 INJECTION SUBCUTANEOUS at 08:01

## 2024-08-01 RX ADMIN — POTASSIUM CHLORIDE 10 MEQ: 7.46 INJECTION, SOLUTION INTRAVENOUS at 10:24

## 2024-08-01 RX ADMIN — SODIUM CHLORIDE, PRESERVATIVE FREE 40 MG: 5 INJECTION INTRAVENOUS at 07:58

## 2024-08-01 ASSESSMENT — ENCOUNTER SYMPTOMS
RHINORRHEA: 0
COUGH: 0
SHORTNESS OF BREATH: 0
SORE THROAT: 0

## 2024-08-01 NOTE — PLAN OF CARE
Problem: Safety - Adult  Goal: Free from fall injury  7/31/2024 1100 by Regina Chin RN  Outcome: Progressing  Flowsheets (Taken 7/31/2024 1059)  Free From Fall Injury: Instruct family/caregiver on patient safety     Problem: Pain  Goal: Verbalizes/displays adequate comfort level or baseline comfort level  7/31/2024 1100 by Regina Chin, RN  Outcome: Progressing     Problem: Discharge Planning  Goal: Discharge to home or other facility with appropriate resources  7/31/2024 1100 by Regina Chin RN  Outcome: Progressing

## 2024-08-01 NOTE — PLAN OF CARE
Problem: Discharge Planning  Goal: Discharge to home or other facility with appropriate resources  Outcome: Progressing     Problem: Pain  Goal: Verbalizes/displays adequate comfort level or baseline comfort level  Outcome: Progressing     Problem: Safety - Adult  Goal: Free from fall injury  Outcome: Progressing     Problem: ABCDS Injury Assessment  Goal: Absence of physical injury  Outcome: Progressing  Flowsheets (Taken 7/31/2024 2000)  Absence of Physical Injury: Implement safety measures based on patient assessment

## 2024-08-01 NOTE — PROGRESS NOTES
08/01/24 1541   Encounter Summary   Encounter Overview/Reason Volunteer Encounter   Service Provided For Patient   Last Encounter  08/01/24   Rituals, Rites and Sacraments   Type Congregation Communion   Assessment/Intervention/Outcome   Intervention Prayer (assurance of)/[place]

## 2024-08-01 NOTE — PROGRESS NOTES
Patients K is 3.5. I don't have an order for IV for the correct parameters. I sent Sandee a message. Waiting for response

## 2024-08-01 NOTE — PROGRESS NOTES
[facility] General Surgery   Emeka Del Valle MD, FACS  Sandee Iverson, APRN-CNP  [address], [address]  [place] [address]  P: [phone], F: [phone]    General and Robotic Surgery  Progress Note             PATIENT NAME: Gareth Torres   :  1965   MRN: [id number]   PCP:  Homar Panda MD     TODAY'S DATE: 2024    59 y.o. male was seen and examined this morning around 7:00 and again around 11 AM.  Patient continues to have bowel movements.  Passing flatus.  Voiding well.  No nausea vomiting.  Denied any abdominal pain.  NG output has definitely slowed down.  No fever or chills.    PAST MEDICAL HISTORY   History reviewed. No pertinent past medical history.    PROBLEM LIST     Patient Active Problem List   Diagnosis    SBO (small bowel obstruction) (HCC)       SURGICAL HISTORY     History reviewed. No pertinent surgical history.      Review of Systems   Constitutional:  Negative for chills and fever.   HENT:  Negative for congestion, rhinorrhea and sore throat.    Respiratory:  Negative for cough and shortness of breath.    Cardiovascular:  Negative for chest pain.   Gastrointestinal:         See HPI.   Genitourinary: Negative.    Skin: Negative.        VITALS:  BP (!) 152/93   Pulse 84   Temp 98.6 °F (37 °C)   Resp 16   Ht 1.702 m (5' 7\")   Wt 65.5 kg (144 lb 6.4 oz)   SpO2 99%   BMI 22.62 kg/m²       Physical Exam  Vitals reviewed.   Constitutional:       General: He is not in acute distress.     Appearance: Normal appearance. He is not ill-appearing or toxic-appearing.   HENT:      Head: Normocephalic and atraumatic.      Right Ear: External ear normal.      Left Ear: External ear normal.      Nose: Nose normal.   Eyes:      General: No scleral icterus.     Conjunctiva/sclera: Conjunctivae normal.   Neck:      Trachea: Trachea normal.   Cardiovascular:      Rate and Rhythm: Normal rate.   Pulmonary:      Effort: Pulmonary effort is normal. No accessory muscle usage or  periumbilical abdominal pain with  nausea/vomiting and excessive burping. On-going for 3-4 days    FINDINGS:  Lower Chest: Clear    Organs:    Fat containing umbilical hernia.    The liver, spleen, pancreas, and adrenals appear normal.  Gallbladder normal.    Kidneys appear normal.    The bladder appears normal.    GI/Bowel: The stomach,small bowel, and colon appear normal. Colonic  diverticulosis.  Appendix normal.  Multiple dilated loops of small bowel with  air-fluid levels.  No bowel wall thickening or pneumatosis.  Small amount of  fluid in the mesentery.  Significant caliber change noted.    Pelvis: Normal.    Peritoneum/Retroperitoneum: The abdominal aorta and iliac arteries are normal  in caliber. There is no pathologic adenopathy.    Bones/Soft Tissues: Disc space narrowing lumbar spine.    Impression  Small bowel obstruction.  Surgical consult recommended.      Hospital Problems             Last Modified POA    * (Principal) SBO (small bowel obstruction) ([facility]) 7/30/2024 Yes         ASSESSMENT   59 y.o. male with abdominal pain small bowel obstruction clinically and radiographically resolving.  NG output has slowed down.  Patient continues to have bowel function.  Blood work noted.  Potassium was being replaced.  Sodium normal.  Creatinine is normal.  WBC count normal.  Hemoglobin stable at 11.9.  Platelet count adequate.  KUB shows contrast material now predominantly in the colon which is nondistended.  Slight distention of some small bowel loops in the upper abdomen noted decrease in diameter compared to prior study.  Findings compatible with resolving partial small bowel obstruction.    PLAN  Patient was seen again twice today and in the morning patient's NG tube was clamped for about 4 hours.  Patient denied any nausea or vomiting after the NG tube was clamped.  Patient had few more bowel movements after that.  NG tube was removed by me and patient will be started on clear liquid diet.  Patient will be

## 2024-08-01 NOTE — PLAN OF CARE
Problem: Discharge Planning  Goal: Discharge to home or other facility with appropriate resources  8/1/2024 1522 by Netta Gamboa RN  Outcome: Progressing     Problem: Pain  Goal: Verbalizes/displays adequate comfort level or baseline comfort level  8/1/2024 1522 by Netta Gamboa RN  Outcome: Progressing     Problem: Safety - Adult  Goal: Free from fall injury  8/1/2024 1522 by Netta Gamboa RN  Outcome: Progressing     Problem: ABCDS Injury Assessment  Goal: Absence of physical injury  8/1/2024 0430 by Jasmin Hernandez RN  Outcome: Progressing  Flowsheets (Taken 7/31/2024 2000)  Absence of Physical Injury: Implement safety measures based on patient assessment     Problem: ABCDS Injury Assessment  Goal: Absence of physical injury  8/1/2024 1522 by Netta Gamboa RN  Outcome: Progressing

## 2024-08-01 NOTE — PROGRESS NOTES
Hemoglobin 11.9 (L) 13.5 - 17.5 g/dL    Hematocrit 36.5 (L) 41 - 53 %    MCV 98.3 80 - 100 fL    MCH 32.1 26 - 34 pg    MCHC 32.7 31 - 37 g/dL    RDW 13.5 11.5 - 14.9 %    Platelets 248 150 - 450 k/uL    MPV 7.2 6.0 - 12.0 fL    Neutrophils % 67 (H) 36 - 66 %    Lymphocytes % 17 (L) 24 - 44 %    Monocytes % 13 (H) 1 - 7 %    Eosinophils % 2 0 - 4 %    Basophils % 1 0 - 2 %    Neutrophils Absolute 4.70 1.3 - 9.1 k/uL    Lymphocytes Absolute 1.20 1.0 - 4.8 k/uL    Monocytes Absolute 0.90 0.1 - 1.3 k/uL    Eosinophils Absolute 0.10 0.0 - 0.4 k/uL    Basophils Absolute 0.00 0.0 - 0.2 k/uL   Magnesium    Collection Time: 08/01/24  6:49 AM   Result Value Ref Range    Magnesium 2.3 1.6 - 2.6 mg/dL   Potassium    Collection Time: 08/01/24  1:10 PM   Result Value Ref Range    Potassium 4.0 3.7 - 5.3 mmol/L       Imaging/Diagnostics:        Assessment :      Primary Problem  SBO (small bowel obstruction) ([facility])    Active Hospital Problems    Diagnosis Date Noted    SBO (small bowel obstruction) ([facility]) [K56.609] 07/30/2024       Plan:     Patient status Admit as inpatient in the  Med/Surge    Patient is 59-year-old male with past medical history of hypertension hyperlipidemia, presented to the ER with abdominal pain nausea and vomiting, found to have SBO had previous history of inguinal hernia repair twice  General surgery consulted , patient had small bowel through, currently in process  Pain control continue IV fluids  Hypertension, suboptimally controlled, p.o. medications were held, on Vasotec and hydralazine as needed  Normocytic anemia hemoglobin 12.5, no active blood loss recommend outpatient evaluation  DVT prophylaxis    8/1,  Patient feels better this morning, no abdominal pain and nausea  Had KUB this morning, findings suggestive with resolving partial small bowel obstruction did have bowel movements this morning,  Seen by general surgery NG tube taken out, started on clear liquid diet  Hypokalemia, getting potassium  Hide Neutrogena Products: No Action 4: Continue Detail Level: Zone Other Instructions: Patient using Rodan and fields sulfur wash Continue Regimen: Spironolactone 50mg by mouth once daily

## 2024-08-01 NOTE — CARE COORDINATION
.ONGOING DISCHARGE PLAN:    Patient is alert and oriented x4.    Spoke with patient regarding discharge plan and patient confirms that plan is still home no needs.    Small Bowel follow through-Dilated small bowel with normal caliber colon suggesting partial small bowel obstruction.    NG removed, clear liquid diet.     Will continue to follow for additional discharge needs.    If patient is discharged prior to next notation, then this note serves as note for discharge by case management.    Electronically signed by Sanaz Paul RN on 8/1/2024 at 2:38 PM

## 2024-08-02 VITALS
RESPIRATION RATE: 16 BRPM | SYSTOLIC BLOOD PRESSURE: 123 MMHG | WEIGHT: 144.4 LBS | TEMPERATURE: 98.1 F | HEIGHT: 67 IN | HEART RATE: 61 BPM | DIASTOLIC BLOOD PRESSURE: 63 MMHG | BODY MASS INDEX: 22.66 KG/M2 | OXYGEN SATURATION: 100 %

## 2024-08-02 LAB
EST. AVERAGE GLUCOSE BLD GHB EST-MCNC: 131 MG/DL
HBA1C MFR BLD: 6.2 % (ref 4–6)

## 2024-08-02 PROCEDURE — 99232 SBSQ HOSP IP/OBS MODERATE 35: CPT | Performed by: NURSE PRACTITIONER

## 2024-08-02 PROCEDURE — 6360000002 HC RX W HCPCS: Performed by: NURSE PRACTITIONER

## 2024-08-02 PROCEDURE — 2580000003 HC RX 258: Performed by: NURSE PRACTITIONER

## 2024-08-02 PROCEDURE — 99232 SBSQ HOSP IP/OBS MODERATE 35: CPT | Performed by: INTERNAL MEDICINE

## 2024-08-02 PROCEDURE — 6370000000 HC RX 637 (ALT 250 FOR IP): Performed by: INTERNAL MEDICINE

## 2024-08-02 RX ADMIN — VERAPAMIL HYDROCHLORIDE 360 MG: 180 TABLET, FILM COATED, EXTENDED RELEASE ORAL at 07:57

## 2024-08-02 RX ADMIN — SODIUM CHLORIDE, PRESERVATIVE FREE 40 MG: 5 INJECTION INTRAVENOUS at 07:54

## 2024-08-02 RX ADMIN — SODIUM CHLORIDE, PRESERVATIVE FREE 10 ML: 5 INJECTION INTRAVENOUS at 07:56

## 2024-08-02 RX ADMIN — METOCLOPRAMIDE 5 MG: 5 INJECTION, SOLUTION INTRAMUSCULAR; INTRAVENOUS at 07:51

## 2024-08-02 ASSESSMENT — ENCOUNTER SYMPTOMS
SHORTNESS OF BREATH: 0
COUGH: 0
SORE THROAT: 0
RHINORRHEA: 0

## 2024-08-02 NOTE — PLAN OF CARE
Problem: Discharge Planning  Goal: Discharge to home or other facility with appropriate resources  8/2/2024 0309 by Naga Gray RN  Outcome: Progressing  Flowsheets (Taken 8/1/2024 1947 by Jasmin Hernandez, RN)  Discharge to home or other facility with appropriate resources: Identify discharge learning needs (meds, wound care, etc)  8/1/2024 1522 by Netta Gamboa RN  Outcome: Progressing     Problem: Pain  Goal: Verbalizes/displays adequate comfort level or baseline comfort level  8/2/2024 0309 by Naga Gray RN  Outcome: Progressing  8/1/2024 1522 by Netta Gamboa RN  Outcome: Progressing     Problem: Safety - Adult  Goal: Free from fall injury  8/2/2024 0309 by Naga Gray RN  Outcome: Progressing  Flowsheets (Taken 8/1/2024 1947 by Jasmin Hernandez, RN)  Free From Fall Injury: Instruct family/caregiver on patient safety  8/1/2024 1522 by Netta Gamboa RN  Outcome: Progressing     Problem: ABCDS Injury Assessment  Goal: Absence of physical injury  8/2/2024 0309 by Naga Gray RN  Outcome: Progressing  Flowsheets (Taken 8/1/2024 1947 by Jasmin Hernandez, RN)  Absence of Physical Injury: Implement safety measures based on patient assessment  8/1/2024 1522 by Netta Gamboa RN  Outcome: Progressing

## 2024-08-02 NOTE — PLAN OF CARE
Problem: Discharge Planning  Goal: Discharge to home or other facility with appropriate resources  8/2/2024 1209 by Netta Gamboa, RN  Outcome: Completed     Problem: Pain  Goal: Verbalizes/displays adequate comfort level or baseline comfort level  8/2/2024 1209 by Netta Gamboa, RN  Outcome: Completed     Problem: Safety - Adult  Goal: Free from fall injury  8/2/2024 1209 by Netta Gamboa, RN  Outcome: Completed     Problem: ABCDS Injury Assessment  Goal: Absence of physical injury  8/2/2024 1209 by Netta Gamboa, RN  Outcome: Completed

## 2024-08-02 NOTE — PROGRESS NOTES
[facility]   IN-PATIENT SERVICE   [facility]    HISTORY AND PHYSICAL EXAMINATION            Date:   8/2/2024  Patient name:  Gareth Torres  Date of admission:  7/30/2024  9:30 PM  MRN:   [id number]  Account:  [id number]  YOB: 1965  PCP:    Homar Panda MD  Room:   2050/2050-01  Code Status:    Full Code    Chief Complaint:     No chief complaint on file.      History Obtained From:     patient    History of Present Illness:     The patient is a 59 y.o.  Non- / non  male who presents with No chief complaint on file.   and he is admitted to the hospital for the management of      Gareth Torres is a 59 y.o. Non- / non  male who presents with Abdominal pain, nausea, vomiting   and is admitted to the hospital for the management of SBO (small bowel obstruction) (HCC). Medical history significant for HTN, HLD, hernia repair x2. According to patient, symptoms began on Saturday with abdominal discomfort and nausea. Progressively worse with intermittent diarrhea. Tried pepto-Bismol without improvement. Progressed with vomiting with abdominal distention. Reports being unable to pass gas for the past few hours. CT abdomen reveals SBO. General Surgery consulted in ED, NG tube placed. Denies fever, chills, chest pain, cough and urinary symptoms. Symptoms are reported as acute, constant and severe.     Patient seen and examined on the floor, continues to have intermittent abdominal pain, had small bowel through on my encounter NG tube was clamped,  Seen by general surgeon  No chest pain shortness of      Past Medical History:     History reviewed. No pertinent past medical history.     Past Surgical History:     History reviewed. No pertinent surgical history.     Medications Prior to Admission:     Prior to Admission medications    Medication Sig Start Date End Date Taking? Authorizing Provider   verapamil (CALAN SR) 180 MG extended release tablet Take 2  currently in process  Pain control continue IV fluids  Hypertension, suboptimally controlled, p.o. medications were held, on Vasotec and hydralazine as needed  Normocytic anemia hemoglobin 12.5, no active blood loss recommend outpatient evaluation  DVT prophylaxis    8/1,  Patient feels better this morning, no abdominal pain and nausea  Had KUB this morning, findings suggestive with resolving partial small bowel obstruction did have bowel movements this morning,  Seen by general surgery NG tube taken out, started on clear liquid diet  Hypokalemia, getting potassium replaced, repeat potassium is 4, will DC morning lab work, morning blood glucose is 118, likely prediabetic, advised to get HbA1c as outpatient   No history of diabetes, has been hypertensive this morning, home dose of antihypertensive resume on verapamil and losartan,  Anticipate discharge likely tomorrow once able to tolerate  Send started on diet will decrease IVF  Slight drop in hemoglobin hemoglobin of 11 9, some component could be hemodilutional, recommended outpatient evaluation no active bleeding    8/2  59-year-old male admitted for SBO  Has HTN, HLD, previous hernia repair surgery.  General surgery following  Electrolyte replacement-potassium normal today  NG tube was removed yesterday patient remains on low-dose Reglan-started on soft diet earlier today patient surgery recommendations-will review later today for tolerance  Elevated fasting glucose-ordering hemoglobin A1c  Labs and vitals reviewed; blood pressure heart rate controlled  Patient has been having multiple BMs, passing gas  Abdomen soft nontender, bowel sounds present  Lungs clear, no leg swelling no calf tenderness  Discharge today once tolerates soft diet and cleared by general surgery.      Consultations:   IP CONSULT TO PRIMARY CARE PROVIDER  IP CONSULT TO GENERAL SURGERY     Patient is admitted as inpatient status because of co-morbidities listed above, severity of signs and

## 2024-08-02 NOTE — PROGRESS NOTES
[facility] General Surgery   Emeka Del Valle MD, FACS  Sandee Iverson, APRN-CNP  [address], [address]  [place] [address]  P: [phone], F: [phone]    General and Robotic Surgery  Progress Note             PATIENT NAME: Gareth Torres   :  1965   MRN: [id number]   PCP:  Homar Panda MD     TODAY'S DATE: 2024    59 y.o. male was seen and examined this morning around 7 AM.  Patient sitting up in the chair.  Appears comfortable.  Patient denies any abdominal pain this morning states that he is continue to have bowel movements.  Denies any bloody or black tarry stools.  Does not feel bloated.  Denies nausea and vomiting.  Denies fever and chills.  Please note this is a late entry and patient has since been discharged.    PAST MEDICAL HISTORY   History reviewed. No pertinent past medical history.    PROBLEM LIST     Patient Active Problem List   Diagnosis    SBO (small bowel obstruction) (HCC)       SURGICAL HISTORY     History reviewed. No pertinent surgical history.      Review of Systems   Constitutional:  Negative for chills and fever.   HENT:  Negative for congestion, rhinorrhea and sore throat.    Respiratory:  Negative for cough and shortness of breath.    Cardiovascular:  Negative for chest pain.   Gastrointestinal:         See HPI.   Genitourinary: Negative.    Skin: Negative.        VITALS:  /63   Pulse 61   Temp 98.1 °F (36.7 °C)   Resp 16   Ht 1.702 m (5' 7\")   Wt 65.5 kg (144 lb 6.4 oz)   SpO2 100%   BMI 22.62 kg/m²       Physical Exam  Vitals reviewed.   Constitutional:       General: He is not in acute distress.     Appearance: Normal appearance. He is not ill-appearing or toxic-appearing.   HENT:      Head: Normocephalic and atraumatic.      Right Ear: External ear normal.      Left Ear: External ear normal.      Nose: Nose normal.   Eyes:      General: No scleral icterus.     Conjunctiva/sclera: Conjunctivae normal.   Neck:      Trachea: Trachea

## 2024-08-02 NOTE — CARE COORDINATION
DISCHARGE PLANNING NOTE:    Chart reviewed.  Plan remains for patient to be discharged home without needs. VNS has been declined.    Pt will be discharged home today once tolerating soft diet and cleared by general surgery.    Will continue to follow for additional discharge needs.    Electronically signed by Sanaz Gallego RN on 8/2/2024 at 12:12 PM

## 2024-08-02 NOTE — PROGRESS NOTES
Patient was given discharge instructions and all questions answered. IV was removed without complications. Personal belongings were gathered. Patient was offered a wheelchair but is choosing to walk down independently with wife. Waiting for wife to arrive.

## 2024-08-05 NOTE — DISCHARGE SUMMARY
IN-PATIENT SERVICE   [facility] Internal Medicine    Discharge Summary     Patient ID: Gareth Torres  :  1965   MRN: [id number]     ACCOUNT:  [id number]   Patient's PCP: Homar Panda MD  Admit Date: 2024   Discharge Date: 2024  Length of Stay: 3  Code Status:  Prior  Admitting Physician: Edwin Ramos MD  Discharge Physician: Evita Pitts MD     Active Discharge Diagnoses:     Primary Problem  SBO (small bowel obstruction) (HCC)      Hospital Problems  Active Hospital Problems    Diagnosis Date Noted    SBO (small bowel obstruction) (HCC) [K56.609] 2024       Admission Condition:  fair     Discharged Condition: fair    Hospital Stay:     Hospital Course:  Gareth Torres is a 59 y.o. male who was admitted for the management of SBO (small bowel obstruction) (HCC) , presented to ER with No chief complaint on file.    59-year-old male history of hypertension hyperlipidemia presented to ER with abdominal pain nausea vomiting found to have SBO has history of inguinal hernia repair twice in the past.  General surgery was consulted treated with bowel rest IV fluids.  Underwent small through on  which showed partial small bowel obstruction  Patient was treated conservatively diet was gradually advanced  Electrolytes replaced as needed  Tolerated soft diet by , and subsequently discharged home      Patient seen and examined on the day of discharge    Denies any shortness of breath or cough  Denies chest pain or palpitations  Denies abdominal pain, diarrhea vomiting  Denies any new numbness tremors or weakness.      General appearance:  alert, cooperative and no distress  Eyes: Anicteric sclera. Pupils are equally round and reactive to light.  Extraocular movements are intact.  Lungs:  clear to auscultation bilaterally, normal effort  Heart:  regular rate and rhythm, no murmur  Abdomen:  soft, nontender, nondistended, normal bowel sounds, no masses,   Extremities:  no